# Patient Record
Sex: MALE | Race: WHITE | NOT HISPANIC OR LATINO | URBAN - METROPOLITAN AREA
[De-identification: names, ages, dates, MRNs, and addresses within clinical notes are randomized per-mention and may not be internally consistent; named-entity substitution may affect disease eponyms.]

---

## 2020-06-18 ENCOUNTER — TELEPHONE ENCOUNTER (OUTPATIENT)
Dept: URBAN - METROPOLITAN AREA CLINIC 80 | Facility: CLINIC | Age: 35
End: 2020-06-18

## 2020-06-18 RX ORDER — PREDNISONE 20 MG/1
2 TABLETS TABLET ORAL ONCE A DAY
Qty: 28 | Refills: 1 | OUTPATIENT
Start: 2020-06-18 | End: 2020-07-16

## 2020-06-24 LAB — C DIFFICILE TOXINS A+B, EIA: NEGATIVE

## 2020-07-02 ENCOUNTER — ERX REFILL RESPONSE (OUTPATIENT)
Age: 35
End: 2020-07-02

## 2020-07-02 RX ORDER — ADALIMUMAB 40MG/0.4ML
INJECT 1 PEN UNDER THE SKIN EVERY 14 DAYS IN THE ABDOMEN OR THIGH (ROTATE SITES). REFRIGERATE KIT SUBCUTANEOUS
Qty: 2 | Refills: 11

## 2020-07-21 ENCOUNTER — OFFICE VISIT (OUTPATIENT)
Dept: URBAN - METROPOLITAN AREA CLINIC 80 | Facility: CLINIC | Age: 35
End: 2020-07-21
Payer: COMMERCIAL

## 2020-07-21 ENCOUNTER — WEB ENCOUNTER (OUTPATIENT)
Dept: URBAN - METROPOLITAN AREA CLINIC 80 | Facility: CLINIC | Age: 35
End: 2020-07-21

## 2020-07-21 DIAGNOSIS — K51.90 ULCERATIVE COLITIS: ICD-10-CM

## 2020-07-21 DIAGNOSIS — R53.83 FATIGUE: ICD-10-CM

## 2020-07-21 DIAGNOSIS — D64.9 ANEMIA: ICD-10-CM

## 2020-07-21 PROCEDURE — G9903 PT SCRN TBCO ID AS NON USER: HCPCS | Performed by: INTERNAL MEDICINE

## 2020-07-21 PROCEDURE — G8427 DOCREV CUR MEDS BY ELIG CLIN: HCPCS | Performed by: INTERNAL MEDICINE

## 2020-07-21 PROCEDURE — G8417 CALC BMI ABV UP PARAM F/U: HCPCS | Performed by: INTERNAL MEDICINE

## 2020-07-21 PROCEDURE — 99214 OFFICE O/P EST MOD 30 MIN: CPT | Performed by: INTERNAL MEDICINE

## 2020-07-21 NOTE — HPI-TODAY'S VISIT:
This is a scheduled appointment for this patient, a 35 year old /White male, after a previous visit on 1/2020, for a follow-up evaluation of Ulcerative colitis.   He was diagnosed with UC in 6/2013. He did not respond to imuran and steroids. repeat colonoscopy in 8/2013 showed persistent pancolitis. He was started on humira with imuran 50mg to prevent antibody formation. he had prior hx of anemia and iron deficiency. he was on oral iron replacement with poor response. it resolved after IV iron infusion which were given in 10/2015. he did have a repeat colonscopy in 5/2017 with pseudopolyps and quiescent disease confirmed again on colon in 2/2019. he has been asymptomatic on humira and we weaned off imuran since.  he returns for follow up today.   he has been largely well had a flare 1 month ago he improved with pred course x 2 weeks.  finished 3 weeks stools back to 1-2 bm formed.  no blood or mucus no rectal pain or abd pain energy is improving but still low appetite is normal no n/v he has not seen derm and has not had his flu or shingles vaccine this year.  weight is stable  no new complaints

## 2020-07-22 ENCOUNTER — TELEPHONE ENCOUNTER (OUTPATIENT)
Dept: URBAN - METROPOLITAN AREA CLINIC 92 | Facility: CLINIC | Age: 35
End: 2020-07-22

## 2020-07-22 LAB
A/G RATIO: 2
ALBUMIN: 4.7
ALKALINE PHOSPHATASE: 48
ALT (SGPT): 15
AST (SGOT): 13
BASO (ABSOLUTE): 0.1
BASOS: 1
BILIRUBIN, TOTAL: 0.5
BUN/CREATININE RATIO: 11
BUN: 10
C-REACTIVE PROTEIN, QUANT: <1
CALCIUM: 10.6
CARBON DIOXIDE, TOTAL: 24
CHLORIDE: 99
CREATININE: 0.95
EGFR IF AFRICN AM: 120
EGFR IF NONAFRICN AM: 104
EOS (ABSOLUTE): 0.1
EOS: 2
FERRITIN, SERUM: 84
GLOBULIN, TOTAL: 2.4
GLUCOSE: 93
HEMATOCRIT: 49.5
HEMATOLOGY COMMENTS:: (no result)
HEMOGLOBIN: 16.3
IMMATURE CELLS: (no result)
IMMATURE GRANS (ABS): 0
IMMATURE GRANULOCYTES: 0
LYMPHS (ABSOLUTE): 2.7
LYMPHS: 42
MCH: 28.3
MCHC: 32.9
MCV: 86
MONOCYTES(ABSOLUTE): 0.7
MONOCYTES: 11
NEUTROPHILS (ABSOLUTE): 2.9
NEUTROPHILS: 44
NRBC: (no result)
PLATELETS: 282
POTASSIUM: 4
PROTEIN, TOTAL: 7.1
RBC: 5.76
RDW: 13.1
SODIUM: 140
VITAMIN D, 25-HYDROXY: 12.4
WBC: 6.5

## 2021-01-25 ENCOUNTER — DASHBOARD ENCOUNTERS (OUTPATIENT)
Age: 36
End: 2021-01-25

## 2021-01-25 ENCOUNTER — OFFICE VISIT (OUTPATIENT)
Dept: URBAN - METROPOLITAN AREA CLINIC 80 | Facility: CLINIC | Age: 36
End: 2021-01-25
Payer: COMMERCIAL

## 2021-01-25 VITALS
DIASTOLIC BLOOD PRESSURE: 72 MMHG | HEIGHT: 71 IN | HEART RATE: 73 BPM | SYSTOLIC BLOOD PRESSURE: 112 MMHG | TEMPERATURE: 97.3 F | WEIGHT: 209.2 LBS | BODY MASS INDEX: 29.29 KG/M2

## 2021-01-25 DIAGNOSIS — D64.9 ANEMIA: ICD-10-CM

## 2021-01-25 DIAGNOSIS — K51.90 ULCERATIVE COLITIS: ICD-10-CM

## 2021-01-25 DIAGNOSIS — R53.83 FATIGUE: ICD-10-CM

## 2021-01-25 PROCEDURE — G9903 PT SCRN TBCO ID AS NON USER: HCPCS | Performed by: INTERNAL MEDICINE

## 2021-01-25 PROCEDURE — 99214 OFFICE O/P EST MOD 30 MIN: CPT | Performed by: INTERNAL MEDICINE

## 2021-01-25 PROCEDURE — G8427 DOCREV CUR MEDS BY ELIG CLIN: HCPCS | Performed by: INTERNAL MEDICINE

## 2021-01-25 PROCEDURE — G8484 FLU IMMUNIZE NO ADMIN: HCPCS | Performed by: INTERNAL MEDICINE

## 2021-01-25 PROCEDURE — G8417 CALC BMI ABV UP PARAM F/U: HCPCS | Performed by: INTERNAL MEDICINE

## 2021-01-25 RX ORDER — SODIUM, POTASSIUM,MAG SULFATES 17.5-3.13G
354 ML SOLUTION, RECONSTITUTED, ORAL ORAL
Qty: 354 ML | Refills: 0 | OUTPATIENT
Start: 2021-01-25

## 2021-01-25 NOTE — HPI-TODAY'S VISIT:
This is a scheduled appointment for this patient, a 35 year old /White male, after a previous visit on 7/2020, for a follow-up evaluation of Ulcerative colitis.   He was diagnosed with UC in 6/2013. He did not respond to imuran and steroids. repeat colonoscopy in 8/2013 showed persistent pancolitis. He was started on humira with imuran 50mg to prevent antibody formation. he had prior hx of anemia and iron deficiency. he was on oral iron replacement with poor response. it resolved after IV iron infusion which were given in 10/2015. he did have a repeat colonscopy in 5/2017 with pseudopolyps and quiescent disease confirmed again on colon in 2/2019. he has been asymptomatic on humira and we weaned off imuran since.  he returns for follow up today.   he had a flare in 7/2020 and was given 2 weeks of prednisone has started l-arginine without any flares since he notes normal stools with 1-2 bm formed stools no blood or mucus no rectal pain or abd pain energy is stable but low appetite is normal has gained weight since last visit no n/v no dysphagia he has not seen derm and has not had his flu or shingles vaccine this year.  weight is stable  no new complaints

## 2021-02-01 LAB
A/G RATIO: 1.7
ADALIMUMAB DRUG LEVEL: 9.7
ALBUMIN: 4.6
ALKALINE PHOSPHATASE: 53
ALT (SGPT): 18
ANTI-ADALIMUMAB ANTIBODY: <25
AST (SGOT): 15
BASO (ABSOLUTE): 0.1
BASOS: 1
BILIRUBIN, TOTAL: 0.4
BUN/CREATININE RATIO: 14
BUN: 13
C-REACTIVE PROTEIN, QUANT: <1
CALCIUM: 10.5
CARBON DIOXIDE, TOTAL: 23
CHLORIDE: 102
CREATININE: 0.95
EGFR IF AFRICN AM: 120
EGFR IF NONAFRICN AM: 104
EOS (ABSOLUTE): 0.2
EOS: 3
GLOBULIN, TOTAL: 2.7
GLUCOSE: 93
HEMATOCRIT: 47.6
HEMATOLOGY COMMENTS:: (no result)
HEMOGLOBIN: 16
IMMATURE CELLS: (no result)
IMMATURE GRANS (ABS): 0
IMMATURE GRANULOCYTES: 0
LYMPHS (ABSOLUTE): 2.9
LYMPHS: 42
MCH: 28
MCHC: 33.6
MCV: 83
MONOCYTES(ABSOLUTE): 0.7
MONOCYTES: 10
NEUTROPHILS (ABSOLUTE): 3.1
NEUTROPHILS: 44
NRBC: (no result)
PLATELETS: 283
POTASSIUM: 4.4
PROTEIN, TOTAL: 7.3
RBC: 5.72
RDW: 12.9
SODIUM: 140
WBC: 6.9

## 2021-03-01 ENCOUNTER — OFFICE VISIT (OUTPATIENT)
Dept: URBAN - METROPOLITAN AREA SURGERY CENTER 19 | Facility: SURGERY CENTER | Age: 36
End: 2021-03-01
Payer: COMMERCIAL

## 2021-03-01 ENCOUNTER — CLAIMS CREATED FROM THE CLAIM WINDOW (OUTPATIENT)
Dept: URBAN - METROPOLITAN AREA CLINIC 4 | Facility: CLINIC | Age: 36
End: 2021-03-01
Payer: COMMERCIAL

## 2021-03-01 DIAGNOSIS — D12.4 BENIGN NEOPLASM OF DESCENDING COLON: ICD-10-CM

## 2021-03-01 DIAGNOSIS — K63.89 MASS OF HEPATIC FLEXURE OF COLON: ICD-10-CM

## 2021-03-01 DIAGNOSIS — K63.89 BACTERIAL OVERGROWTH SYNDROME: ICD-10-CM

## 2021-03-01 DIAGNOSIS — K51.00 CHRONIC PANCOLONIC ULCERATIVE COLITIS: ICD-10-CM

## 2021-03-01 PROCEDURE — G8907 PT DOC NO EVENTS ON DISCHARG: HCPCS | Performed by: INTERNAL MEDICINE

## 2021-03-01 PROCEDURE — 88305 TISSUE EXAM BY PATHOLOGIST: CPT | Performed by: PATHOLOGY

## 2021-03-01 PROCEDURE — 45385 COLONOSCOPY W/LESION REMOVAL: CPT | Performed by: INTERNAL MEDICINE

## 2021-03-01 PROCEDURE — 88342 IMHCHEM/IMCYTCHM 1ST ANTB: CPT | Performed by: PATHOLOGY

## 2021-05-28 ENCOUNTER — ERX REFILL RESPONSE (OUTPATIENT)
Dept: URBAN - METROPOLITAN AREA CLINIC 80 | Facility: CLINIC | Age: 36
End: 2021-05-28

## 2021-05-28 RX ORDER — ADALIMUMAB 40MG/0.4ML
INJECT 1 PEN UNDER THE SKIN EVERY 14 DAYS IN THE ABDOMEN OR THIGH (ROTATE SITES). REFRIGERATE KIT SUBCUTANEOUS
Qty: 2 | Refills: 10

## 2021-12-29 ENCOUNTER — TELEPHONE ENCOUNTER (OUTPATIENT)
Dept: URBAN - METROPOLITAN AREA CLINIC 5 | Facility: CLINIC | Age: 36
End: 2021-12-29

## 2022-02-03 ENCOUNTER — TELEPHONE ENCOUNTER (OUTPATIENT)
Dept: URBAN - METROPOLITAN AREA CLINIC 80 | Facility: CLINIC | Age: 37
End: 2022-02-03

## 2022-02-16 ENCOUNTER — TELEPHONE ENCOUNTER (OUTPATIENT)
Dept: URBAN - METROPOLITAN AREA CLINIC 128 | Facility: CLINIC | Age: 37
End: 2022-02-16

## 2022-02-19 LAB
QUANTIFERON CRITERIA: (no result)
QUANTIFERON INCUBATION: (no result)
QUANTIFERON MITOGEN VALUE: >10
QUANTIFERON NIL VALUE: 0.02
QUANTIFERON TB1 AG VALUE: 0.03
QUANTIFERON TB2 AG VALUE: 0.03
QUANTIFERON-TB GOLD PLUS: NEGATIVE

## 2022-05-31 ENCOUNTER — ERX REFILL RESPONSE (OUTPATIENT)
Dept: URBAN - METROPOLITAN AREA CLINIC 80 | Facility: CLINIC | Age: 37
End: 2022-05-31

## 2022-05-31 RX ORDER — ADALIMUMAB 40MG/0.4ML
INJECT 1 PEN UNDER THE SKIN EVERY 14 DAYS KIT SUBCUTANEOUS
Qty: 2 | Refills: 12 | OUTPATIENT

## 2022-05-31 RX ORDER — ADALIMUMAB 40MG/0.4ML
INJECT 1 PEN UNDER THE SKIN EVERY 14 DAYS IN THE ABDOMEN OR THIGH (ROTATE SITES). REFRIGERATE KIT SUBCUTANEOUS
Qty: 2 | Refills: 10 | OUTPATIENT

## 2024-01-25 ENCOUNTER — CLAIMS CREATED FROM THE CLAIM WINDOW (OUTPATIENT)
Dept: URBAN - METROPOLITAN AREA MEDICAL CENTER 18 | Facility: MEDICAL CENTER | Age: 39
End: 2024-01-25
Payer: COMMERCIAL

## 2024-01-25 DIAGNOSIS — K50.90 ABDOMINAL PAIN DESPITE THERAPY FOR CROHN'S DISEASE: ICD-10-CM

## 2024-01-25 DIAGNOSIS — K85.10 ACUTE BILIARY PANCREATITIS: ICD-10-CM

## 2024-01-25 DIAGNOSIS — R74.01 ABNORMAL/ELEVATED TRANSAMINASE (SGOT, AMINOTRANSFERASE): ICD-10-CM

## 2024-01-25 PROCEDURE — 99254 IP/OBS CNSLTJ NEW/EST MOD 60: CPT | Performed by: INTERNAL MEDICINE

## 2024-01-25 PROCEDURE — 99222 1ST HOSP IP/OBS MODERATE 55: CPT | Performed by: INTERNAL MEDICINE

## 2024-01-25 PROCEDURE — G8427 DOCREV CUR MEDS BY ELIG CLIN: HCPCS | Performed by: INTERNAL MEDICINE
